# Patient Record
Sex: FEMALE | Race: WHITE | Employment: OTHER | URBAN - NONMETROPOLITAN AREA
[De-identification: names, ages, dates, MRNs, and addresses within clinical notes are randomized per-mention and may not be internally consistent; named-entity substitution may affect disease eponyms.]

---

## 2019-10-31 ENCOUNTER — OFFICE VISIT (OUTPATIENT)
Dept: CARDIOLOGY CLINIC | Age: 79
End: 2019-10-31
Payer: MEDICARE

## 2019-10-31 VITALS
HEART RATE: 60 BPM | SYSTOLIC BLOOD PRESSURE: 124 MMHG | DIASTOLIC BLOOD PRESSURE: 74 MMHG | WEIGHT: 206 LBS | HEIGHT: 63 IN | BODY MASS INDEX: 36.5 KG/M2

## 2019-10-31 DIAGNOSIS — R06.02 SHORTNESS OF BREATH: ICD-10-CM

## 2019-10-31 DIAGNOSIS — I10 ESSENTIAL HYPERTENSION: Primary | ICD-10-CM

## 2019-10-31 DIAGNOSIS — I42.0 DILATED CARDIOMYOPATHY (HCC): ICD-10-CM

## 2019-10-31 PROCEDURE — 99204 OFFICE O/P NEW MOD 45 MIN: CPT | Performed by: NUCLEAR MEDICINE

## 2019-10-31 RX ORDER — AMLODIPINE BESYLATE 5 MG/1
5 TABLET ORAL DAILY
COMMUNITY
End: 2021-09-02

## 2019-10-31 RX ORDER — METOPROLOL SUCCINATE 25 MG/1
25 TABLET, EXTENDED RELEASE ORAL DAILY
Qty: 30 TABLET | Refills: 3 | Status: SHIPPED | OUTPATIENT
Start: 2019-10-31 | End: 2020-03-02 | Stop reason: SDUPTHER

## 2019-10-31 RX ORDER — M-VIT,TX,IRON,MINS/CALC/FOLIC 27MG-0.4MG
1 TABLET ORAL DAILY
COMMUNITY
End: 2021-09-02

## 2019-10-31 ASSESSMENT — ENCOUNTER SYMPTOMS
BACK PAIN: 1
PHOTOPHOBIA: 0
ABDOMINAL PAIN: 0
COLOR CHANGE: 0
CONSTIPATION: 0
NAUSEA: 0
RECTAL PAIN: 0
CHEST TIGHTNESS: 0
VOMITING: 0
ANAL BLEEDING: 0
ABDOMINAL DISTENTION: 0
SHORTNESS OF BREATH: 1
BLOOD IN STOOL: 0
DIARRHEA: 0

## 2019-11-20 DIAGNOSIS — I42.0 DILATED CARDIOMYOPATHY (HCC): ICD-10-CM

## 2019-11-20 DIAGNOSIS — I10 ESSENTIAL HYPERTENSION: ICD-10-CM

## 2019-11-20 DIAGNOSIS — R06.02 SHORTNESS OF BREATH: ICD-10-CM

## 2020-03-02 RX ORDER — METOPROLOL SUCCINATE 25 MG/1
25 TABLET, EXTENDED RELEASE ORAL DAILY
Qty: 90 TABLET | Refills: 2 | Status: SHIPPED | OUTPATIENT
Start: 2020-03-02

## 2020-08-24 ENCOUNTER — OFFICE VISIT (OUTPATIENT)
Dept: CARDIOLOGY CLINIC | Age: 80
End: 2020-08-24
Payer: MEDICARE

## 2020-08-24 VITALS
HEIGHT: 63 IN | WEIGHT: 215.8 LBS | BODY MASS INDEX: 38.24 KG/M2 | SYSTOLIC BLOOD PRESSURE: 180 MMHG | HEART RATE: 66 BPM | DIASTOLIC BLOOD PRESSURE: 78 MMHG

## 2020-08-24 PROCEDURE — 93000 ELECTROCARDIOGRAM COMPLETE: CPT | Performed by: NUCLEAR MEDICINE

## 2020-08-24 PROCEDURE — 99213 OFFICE O/P EST LOW 20 MIN: CPT | Performed by: NUCLEAR MEDICINE

## 2020-08-24 RX ORDER — MONTELUKAST SODIUM 10 MG/1
10 TABLET ORAL NIGHTLY
COMMUNITY
Start: 2020-06-03 | End: 2022-09-01

## 2020-08-24 NOTE — PROGRESS NOTES
[address] [id number]  Dept: [phone]  Dept Fax: [phone]  Loc: [phone]    Visit Date: 8/24/2020    Rojelio Astorga is a 70 y.o. female who presents todayfor:  Chief Complaint   Patient presents with   [place]    Hypertension    Shortness of Breath     Had saddle PE last year   Was taken off eliquis by pulmonary    No chest pain   Some baseline dyspnea  BP is stable usually   Higher today   No new symptoms  No dizziness  No syncope      HPI:  HPI  Past Medical History:   Diagnosis Date    DVT (deep venous thrombosis) ([facility] [address]       Past Surgical History:   Procedure Laterality Date    TOTAL KNEE ARTHROPLASTY       History reviewed. No pertinent family history. Social History     Tobacco Use    Smoking status: Never Smoker    Smokeless tobacco: Never Used   Substance Use Topics    Alcohol use: Not on file      Current Outpatient Medications   Medication Sig Dispense Refill    montelukast (SINGULAIR) 10 MG tablet Take 10 mg by mouth nightly       metoprolol succinate (TOPROL XL) 25 MG extended release tablet Take 1 tablet by mouth daily 90 tablet 2    Multiple Vitamins-Minerals (THERAPEUTIC MULTIVITAMIN-MINERALS) tablet Take 1 tablet by mouth daily      amLODIPine (NORVASC) 5 MG tablet Take 5 mg by mouth daily       No current facility-administered medications for this visit.       No Known Allergies  Health Maintenance   Topic Date Due    Shingles Vaccine (1 of 2) 12/21/1992    DEXA (modify frequency per FRAX score)  12/21/1997    Pneumococcal 65+ years Vaccine (1 of 1 - PPSV23) 12/21/2007    Annual Wellness Visit (AWV)  10/15/2019    Flu vaccine (1) 09/01/2020    DTaP/Tdap/Td vaccine (2 - Td) 07/19/2028    Hepatitis A vaccine  Aged Out    Hepatitis B vaccine  Aged Out    Hib vaccine  Aged Out    Meningococcal (ACWY) vaccine  Aged Out       Subjective:  Review of Systems  General:   No fever, no chills, No fatigue or weight loss  Pulmonary:    some dyspnea, no wheezing  Cardiac:    Denies recent chest pain,   GI:     No nausea or vomiting, no abdominal pain  Neuro:     some dizziness or light headedness,   Musculoskeletal:  No recent active issues  Extremities:   No edema, no obvious claudication       Objective:  Physical Exam  BP (!) 200/90   Pulse 66   Ht 5' 3\" (1.6 m)   Wt 215 lb 12.8 oz (97.9 kg)   BMI 38.23 kg/m²   General:   Well developed, well nourished  Lungs:   Clear to auscultation  Heart:    Normal S1 S2, Slight murmur. no rubs, no gallops  Abdomen:   Soft, non tender, no organomegalies, positive bowel sounds  Extremities:   No edema, no cyanosis, good peripheral pulses  Neurological:   Awake, alert, oriented. No obvious focal deficits  Musculoskelatal:  No obvious deformities    Assessment:      Diagnosis Orders   1. Dilated cardiomyopathy (HCC)  EKG 12 Lead   2. Shortness of breath     cardiac fair for now   Higher BP today   ECG in office was done today. I reviewed the ECG. No acute findings      Plan:  No follow-ups on file. As above  Monitor the BP at home  Continue risk factor modification and medical management  Thank you for allowing me to participate in the care of your patient. Please don't hesitate to contact me regarding any further issues related to the patient care    Orders Placed:  Orders Placed This Encounter   Procedures    EKG 12 Lead     Order Specific Question:   Reason for Exam?     Answer: Other       Medications Prescribed:  No orders of the defined types were placed in this encounter. Discussed use, benefit, and side effects of prescribed medications. All patient questions answered. Pt voicedunderstanding. Instructed to continue current medications, diet and exercise. Continue risk factor modification and medical management. Patient agreed with treatment plan. Follow up as directed.     Electronically signedby Nikunj Lee MD on 8/24/2020 at 1:13

## 2021-09-02 ENCOUNTER — HOSPITAL ENCOUNTER (OUTPATIENT)
Dept: INTERVENTIONAL RADIOLOGY/VASCULAR | Age: 81
Discharge: HOME | End: 2021-09-02
Payer: MEDICARE

## 2021-09-02 ENCOUNTER — TELEPHONE (OUTPATIENT)
Dept: CARDIOLOGY CLINIC | Age: 81
End: 2021-09-02

## 2021-09-02 ENCOUNTER — OFFICE VISIT (OUTPATIENT)
Dept: CARDIOLOGY CLINIC | Age: 81
End: 2021-09-02
Payer: MEDICARE

## 2021-09-02 VITALS
BODY MASS INDEX: 39.44 KG/M2 | HEART RATE: 61 BPM | HEIGHT: 63 IN | WEIGHT: 222.6 LBS | DIASTOLIC BLOOD PRESSURE: 80 MMHG | SYSTOLIC BLOOD PRESSURE: 162 MMHG

## 2021-09-02 DIAGNOSIS — M79.605 PAIN IN BOTH LOWER EXTREMITIES: ICD-10-CM

## 2021-09-02 DIAGNOSIS — R60.0 LEG EDEMA: ICD-10-CM

## 2021-09-02 DIAGNOSIS — M79.604 PAIN IN BOTH LOWER EXTREMITIES: ICD-10-CM

## 2021-09-02 DIAGNOSIS — I10 ESSENTIAL HYPERTENSION: ICD-10-CM

## 2021-09-02 DIAGNOSIS — R06.02 SOB (SHORTNESS OF BREATH): ICD-10-CM

## 2021-09-02 DIAGNOSIS — I42.0 DILATED CARDIOMYOPATHY (HCC): Primary | ICD-10-CM

## 2021-09-02 PROCEDURE — 99214 OFFICE O/P EST MOD 30 MIN: CPT | Performed by: NUCLEAR MEDICINE

## 2021-09-02 PROCEDURE — 93000 ELECTROCARDIOGRAM COMPLETE: CPT | Performed by: NUCLEAR MEDICINE

## 2021-09-02 PROCEDURE — 93970 EXTREMITY STUDY: CPT

## 2021-09-02 RX ORDER — ASPIRIN 81 MG/1
81 TABLET ORAL DAILY
COMMUNITY

## 2021-09-02 RX ORDER — EZETIMIBE 10 MG/1
10 TABLET ORAL DAILY
COMMUNITY
End: 2022-09-01

## 2021-09-02 RX ORDER — COVID-19 ANTIGEN TEST
KIT MISCELLANEOUS
COMMUNITY

## 2021-09-02 NOTE — TELEPHONE ENCOUNTER
PROCEDURE: ECHO. DATE OF SERVICE: 09/09/2021    SERVICE LOCATION: [facility]. CPT CODE: 96501    PHYSICIAN: NORIS    DATE PRIOR AUTH SUBMITTED: 09/02/2021. STATUS:  LULY.

## 2021-09-02 NOTE — PROGRESS NOTES
[id number] [facility] .  [address] [place] [address]  Dept: [phone]  Dept Fax: [phone]  Loc: [phone]    Visit Date: 9/2/2021    Adonis Powell is a 68 y.o. female who presents todayfor:  Chief Complaint   Patient presents with    Check-Up     EKG done today    Cardiomyopathy    Hypertension   some more leg edema  Had previous saddle PE  Was taken off of eliquis   Edema is bilateral in nature  No known CHF  Does have higher BP  Better at home  On zetia for hyperlipidemia  Statins intolerance   Signs of sleep apnea  Weight issues  ? ? Right sided failure         HPI:  HPI  Past Medical History:   Diagnosis Date    DVT (deep venous thrombosis) (HCC)       Past Surgical History:   Procedure Laterality Date    TOTAL KNEE ARTHROPLASTY       No family history on file. Social History     Tobacco Use    Smoking status: Never Smoker    Smokeless tobacco: Never Used   Substance Use Topics    Alcohol use: Not on file      Current Outpatient Medications   Medication Sig Dispense Refill    Naproxen Sodium (ALEVE) 220 MG CAPS Take by mouth      Probiotic Product (PROBIOTIC DAILY PO) Take by mouth      Cholecalciferol (VITAMIN D3 PO) Take by mouth      aspirin 81 MG EC tablet Take 81 mg by mouth daily      ezetimibe (ZETIA) 10 MG tablet Take 10 mg by mouth daily      Ascorbic Acid (VITAMIN C PO) Take by mouth      montelukast (SINGULAIR) 10 MG tablet Take 10 mg by mouth nightly       metoprolol succinate (TOPROL XL) 25 MG extended release tablet Take 1 tablet by mouth daily 90 tablet 2     No current facility-administered medications for this visit.      No Known Allergies  Health Maintenance   Topic Date Due    Hepatitis C screen  Never done    COVID-19 Vaccine (1) Never done    DEXA (modify frequency per FRAX score)  Never done    Pneumococcal 65+ years Vaccine (2 of 2 - PPSV23) 12/21/2007    Annual Wellness Visit (AWV)  Never done prescribed medications. All patient questions answered. Pt voicedunderstanding. Instructed to continue current medications, diet and exercise. Continue risk factor modification and medical management. Patient agreed with treatment plan. Follow up as directed.     Electronically signedby Shereen Casey MD on 9/2/2021 at 1:17 PM

## 2021-09-10 DIAGNOSIS — R06.02 SOB (SHORTNESS OF BREATH): ICD-10-CM

## 2021-09-13 NOTE — TELEPHONE ENCOUNTER
Pt calling for echo results   Given     Asking what the next steps are for her swelling?    Please advise

## 2021-09-14 RX ORDER — FUROSEMIDE 20 MG/1
20 TABLET ORAL DAILY
Qty: 30 TABLET | Refills: 6 | Status: SHIPPED | OUTPATIENT
Start: 2021-09-14 | End: 2022-09-01

## 2022-09-01 ENCOUNTER — OFFICE VISIT (OUTPATIENT)
Dept: CARDIOLOGY CLINIC | Age: 82
End: 2022-09-01
Payer: MEDICARE

## 2022-09-01 VITALS
SYSTOLIC BLOOD PRESSURE: 180 MMHG | DIASTOLIC BLOOD PRESSURE: 69 MMHG | HEIGHT: 62 IN | HEART RATE: 61 BPM | BODY MASS INDEX: 42.03 KG/M2 | WEIGHT: 228.4 LBS

## 2022-09-01 DIAGNOSIS — I42.0 DILATED CARDIOMYOPATHY (HCC): ICD-10-CM

## 2022-09-01 DIAGNOSIS — I10 ESSENTIAL HYPERTENSION: ICD-10-CM

## 2022-09-01 DIAGNOSIS — R60.0 LEG EDEMA: Primary | ICD-10-CM

## 2022-09-01 DIAGNOSIS — E78.5 DYSLIPIDEMIA: ICD-10-CM

## 2022-09-01 PROCEDURE — 1123F ACP DISCUSS/DSCN MKR DOCD: CPT | Performed by: STUDENT IN AN ORGANIZED HEALTH CARE EDUCATION/TRAINING PROGRAM

## 2022-09-01 PROCEDURE — 99214 OFFICE O/P EST MOD 30 MIN: CPT | Performed by: STUDENT IN AN ORGANIZED HEALTH CARE EDUCATION/TRAINING PROGRAM

## 2022-09-01 PROCEDURE — 93000 ELECTROCARDIOGRAM COMPLETE: CPT | Performed by: STUDENT IN AN ORGANIZED HEALTH CARE EDUCATION/TRAINING PROGRAM

## 2022-09-01 NOTE — PROGRESS NOTES
[facility] PROFESSIONAL SERVICES  [facility]   [address] [facility] [address]   Dept: [phone]   Dept Fax: [phone]   Loc: [phone]      Chief Complaint   Patient presents with    1 Year Follow Up       Cardiologist:  Dr. Lizzie Caputo  81 yo female presents for 1 year f/u. Hx of dilated CMP, previously Saddle PE, bilat edema, HTN, HLD, ?FRANK. Had echo and LE dopplers last year. All normal.     Some lower MEGAN. Has had swelling since blood clots, weight is similar to prior No different than normal. No chest pain, no breathing difficulties. No dizziness, . Some brain fog. BP usually runs good, 120s/130s. General:   No fever, no chills, no weight loss, no fatigue  Pulmonary:    No dyspnea, no wheezing  Cardiac:    Denies recent chest pain   GI:     No nausea or vomiting, no abdominal pain  Neuro:     No dizziness or light headedness  Musculoskeletal:  No recent active issues  Extremities:   No edema      Past Medical History:   Diagnosis Date    DVT (deep venous thrombosis) (HCC)        No Known Allergies    Current Outpatient Medications   Medication Sig Dispense Refill    Naproxen Sodium 220 MG CAPS Take by mouth      Probiotic Product (PROBIOTIC DAILY PO) Take by mouth      Cholecalciferol (VITAMIN D3 PO) Take by mouth      aspirin 81 MG EC tablet Take 81 mg by mouth daily      Ascorbic Acid (VITAMIN C PO) Take by mouth      metoprolol succinate (TOPROL XL) 25 MG extended release tablet Take 1 tablet by mouth daily 90 tablet 2     No current facility-administered medications for this visit. Social History     Socioeconomic History    Marital status:      Spouse name: None    Number of children: None    Years of education: None    Highest education level: None   Tobacco Use    Smoking status: Never    Smokeless tobacco: Never   Vaping Use    Vaping Use: Never used       No family history on file.     Blood pressure (!) 180/69, pulse 61, height 5' 2\" (1.575 m), weight 228 lb 6.4 oz (103.6 kg). General:   Well developed, well nourished  Lungs:    Clear to auscultation, no rales  Heart:    RRR, Normal S1 S2, No murmur, rubs, or gallops  Abdomen:   Soft, non tender, no organomegalies, positive bowel sounds  Extremities:   1+ bilat LE  edema, no cyanosis, good peripheral pulses  Neurological:   Awake, alert, oriented. No obvious focal deficits  Musculoskeletal:  No obvious deformities    EKG:          Diagnosis Orders   1. Leg edema  EKG 12 lead      2. Essential hypertension  EKG 12 lead      3. Dyslipidemia  EKG 12 lead      4. Dilated cardiomyopathy ([address]  EKG 12 lead          Orders Placed This Encounter   Procedures    EKG 12 lead     Order Specific Question:   Reason for Exam?     Answer: Other         Assessment/Plan:   Leg edema--ongoing, negative duplex. Similar to prior, no recent changes. Dilated CMP- some swelling, no weight changes. Get better with elevation. Not on any diuretics. Has been the same for a while. Continue current treatment with toprol. HTN-high today, usually runs normal 120s/130s. Will monitor and adjust if necessary. Disposition:   F/u with  [facility] in 1year.    Follow up with Dr Eder Wilson as scheduled or sooner if needed

## 2023-09-06 ENCOUNTER — OFFICE VISIT (OUTPATIENT)
Dept: CARDIOLOGY CLINIC | Age: 83
End: 2023-09-06
Payer: MEDICARE

## 2023-09-06 VITALS
DIASTOLIC BLOOD PRESSURE: 80 MMHG | SYSTOLIC BLOOD PRESSURE: 132 MMHG | WEIGHT: 223 LBS | BODY MASS INDEX: 41.04 KG/M2 | HEART RATE: 63 BPM | HEIGHT: 62 IN

## 2023-09-06 DIAGNOSIS — R60.0 LEG EDEMA: Primary | ICD-10-CM

## 2023-09-06 DIAGNOSIS — I42.0 DILATED CARDIOMYOPATHY (HCC): ICD-10-CM

## 2023-09-06 DIAGNOSIS — I10 ESSENTIAL HYPERTENSION: ICD-10-CM

## 2023-09-06 PROCEDURE — 3075F SYST BP GE 130 - 139MM HG: CPT | Performed by: NUCLEAR MEDICINE

## 2023-09-06 PROCEDURE — 1123F ACP DISCUSS/DSCN MKR DOCD: CPT | Performed by: NUCLEAR MEDICINE

## 2023-09-06 PROCEDURE — 99213 OFFICE O/P EST LOW 20 MIN: CPT | Performed by: NUCLEAR MEDICINE

## 2023-09-06 PROCEDURE — 3079F DIAST BP 80-89 MM HG: CPT | Performed by: NUCLEAR MEDICINE

## 2023-09-06 PROCEDURE — 93000 ELECTROCARDIOGRAM COMPLETE: CPT | Performed by: NUCLEAR MEDICINE

## 2023-09-06 RX ORDER — MONTELUKAST SODIUM 10 MG/1
10 TABLET ORAL NIGHTLY
COMMUNITY

## 2023-09-06 NOTE — PROGRESS NOTES
[address] [id number]  Dept: [phone]  Dept Fax: [phone]  Loc: [phone]    Visit Date: 9/6/2023    Jayde Elmore is a 82 y.o. female who presents todayfor:  Chief Complaint   Patient presents with    Hypertension     Previous saddle PE  Mild CMP  No chest pain   No changes in breathing  Bp is stable  No dizziness  No syncope      HPI:  HPI  Past Medical History:   Diagnosis Date    DVT (deep venous thrombosis) ([address])       Past Surgical History:   Procedure Laterality Date    TOTAL KNEE ARTHROPLASTY       No family history on file. Social History     Tobacco Use    Smoking status: Never    Smokeless tobacco: Never   Substance Use Topics    Alcohol use: Not on file      Current Outpatient Medications   Medication Sig Dispense Refill    montelukast (SINGULAIR) 10 MG tablet Take 1 tablet by mouth nightly      Naproxen Sodium 220 MG CAPS Take by mouth      Probiotic Product (PROBIOTIC DAILY PO) Take by mouth      Cholecalciferol (VITAMIN D3 PO) Take by mouth      aspirin 81 MG EC tablet Take 1 tablet by mouth daily      Ascorbic Acid (VITAMIN C PO) Take by mouth      metoprolol succinate (TOPROL XL) 25 MG extended release tablet Take 1 tablet by mouth daily 90 tablet 2     No current facility-administered medications for this visit.      Allergies   Allergen Reactions    Antihistamines, Diphenhydramine-Type      Sleeps for days      Health Maintenance   Topic Date Due    COVID-19 Vaccine (1) Never done    Depression Screen  Never done    DEXA (modify frequency per FRAX score)  Never done    Pneumococcal 65+ years Vaccine (2 - PPSV23 if available, else PCV20) 08/03/2018    Annual Wellness Visit (AWV)  Never done    Flu vaccine (1) Never done    DTaP/Tdap/Td vaccine (2 - Td or Tdap) 07/19/2028    Shingles vaccine  Completed    Hepatitis A vaccine  Aged Out    Hib vaccine  Aged Out    Meningococcal (ACWY) vaccine

## 2024-09-09 ENCOUNTER — TELEPHONE (OUTPATIENT)
Dept: CARDIOLOGY CLINIC | Age: 84
End: 2024-09-09

## 2024-11-05 ENCOUNTER — TELEPHONE (OUTPATIENT)
Dept: CARDIOLOGY CLINIC | Age: 84
End: 2024-11-05

## 2024-11-05 NOTE — TELEPHONE ENCOUNTER
Lin is calling to RS her appt for today 11-05 appt with Chelsy in [facility] due to be sick, Please call Lin to RS her appt, and she would like [facility] again.